# Patient Record
Sex: FEMALE | Race: WHITE | NOT HISPANIC OR LATINO | URBAN - METROPOLITAN AREA
[De-identification: names, ages, dates, MRNs, and addresses within clinical notes are randomized per-mention and may not be internally consistent; named-entity substitution may affect disease eponyms.]

---

## 2024-05-08 PROBLEM — L29.9 EAR ITCHING: Status: ACTIVE | Noted: 2024-05-08

## 2024-05-08 PROBLEM — H60.8X3 CHRONIC ECZEMATOUS OTITIS EXTERNA OF BOTH EARS: Status: ACTIVE | Noted: 2024-05-08

## 2024-05-08 RX ORDER — FLUOCINOLONE ACETONIDE 0.11 MG/ML
5 OIL AURICULAR (OTIC) 3 TIMES DAILY
COMMUNITY
Start: 2022-03-25

## 2024-05-08 RX ORDER — ASPIRIN 325 MG
1 TABLET ORAL DAILY
COMMUNITY
Start: 2022-03-25

## 2024-05-08 RX ORDER — CHOLECALCIFEROL (VITAMIN D3) 50 MCG
2000 TABLET ORAL DAILY
COMMUNITY
Start: 2022-03-25

## 2024-05-08 RX ORDER — BIOTIN 10 MG
TABLET ORAL
COMMUNITY
Start: 2022-03-25

## 2024-05-10 ENCOUNTER — TELEPHONE (OUTPATIENT)
Dept: OTOLARYNGOLOGY | Facility: CLINIC | Age: 54
End: 2024-05-10

## 2024-05-10 DIAGNOSIS — H60.8X3 CHRONIC ECZEMATOUS OTITIS EXTERNA OF BOTH EARS: Primary | ICD-10-CM

## 2024-05-11 RX ORDER — FLUOCINOLONE ACETONIDE 0.11 MG/ML
OIL AURICULAR (OTIC)
Qty: 20 ML | Refills: 0 | Status: SHIPPED | OUTPATIENT
Start: 2024-05-11

## 2024-05-11 NOTE — TELEPHONE ENCOUNTER
Follow-up in the office for recheck of the ears is advised since it has been 2 years since the last checkup.

## 2025-01-03 NOTE — PROGRESS NOTES
Subjective   Patient ID: Freda Olea is a 54 y.o. female who presents for No chief complaint on file..  HPI  54-year-old female last seen in the office in March 2022 is being seen today for problems related to her history of chronic eczematous otitis externa.  When seen at that previous assessment she was started on Lotrisone ointment and fluocinolone oil drops as treatment a number of years ago during a pregnancy with one of her children she had an ear infection along with facial paralysis that required a myringotomy tube to drain the infection.  Review of Systems   A 12 point ROS  has been reviewed and are negative for complaint except for what is stated in the history of present illness and /or for past medical history as noted in the EMR.    Past Medical History:   Diagnosis Date    Personal history of malignant neoplasm of cervix uteri     History of cervical cancer    Personal history of other diseases of the digestive system     History of gastroesophageal reflux (GERD)          Current Outpatient Medications:     cholecalciferol (Vitamin D-3) 50 MCG (2000 UT) tablet, Take 1 tablet (2,000 Units) by mouth once daily., Disp: , Rfl:     fluocinolone (DermOtic) 0.01 % ear drops, Administer 5 drops into each ear 3 times a day., Disp: , Rfl:     fluocinolone (DermOtic) 0.01 % ear drops, 2 to 3 drops in the ear canal for dryness and irritation 2 days/week, Disp: 20 mL, Rfl: 0    multivitamin (One Daily Multivitamin) tablet, Take 1 tablet by mouth once daily., Disp: , Rfl:     mv-min-C-glutamin-lysine-hb124 (Immune Support) 250-12.5 mg tablet,chewable, Chew., Disp: , Rfl:      Past Surgical History:   Procedure Laterality Date    OTHER SURGICAL HISTORY  03/25/2022    Radical hysterectomy    OTHER SURGICAL HISTORY  03/25/2022    Breast reduction    OTHER SURGICAL HISTORY  03/25/2022    Myringotomy with tube placement    OTHER SURGICAL HISTORY  03/25/2022    Eye surgery        Social History     Tobacco Use     Smoking status: Not on file    Smokeless tobacco: Not on file   Substance Use Topics    Alcohol use: Not on file        Allergies   Allergen Reactions    Ciprofloxacin Unknown    Doxycycline Unknown    Sulfamethoxazole Unknown       There were no vitals taken for this visit.    Objective   Physical Exam  EXAMINATION:    GENERAL JAKY.EARANCE: Alert, in no acute distress, normal pitch and clarity of voice, well-developed and nourished, cooperative.    HEAD/FACE: Normocephalic, atraumatic, normal facial movements and strength, no no tenderness to palpation, no lesions noted.    SKIN: Normal turgor, no raised or ulcerative lesions, warm and dry to palpation.    EYES: Extraocular motions intact, no nystagmus noted, pupils equal and reactive to light and accommodation, no conjunctivitis.    EARS: Both ears--external ear anatomy is normal without lesions, auditory canals are patent and without skin abrasions or lesions, hearing is intact to voice, epithelial and ceruminous debris was partially obstructed to the ear canal and surface of the tympanic membranes bilaterally.  See procedure note     NOSE: No external skin lesions are noted, nares are patent, septum is intact, sinuses are nontender to palpation bilaterally, no intranasal lesions or inflammation is noted, nasal valve is normal.    OROPHARYNX/ORAL CAVITY: Oropharynx is not inflamed and is without lesions, mucosa of the oral cavity is intact and without lesions, tongue is midline and mobile, no acute dental disease is noted, TMJs are mobile    LUNG-- NO wheezing or rhonchi normal respiratory effort    HEART-- No venous congestion,  rate and rhythm regular,    NECK: No lymphadenopathy is palpated, carotid pulses are intact, neck is supple with full range of motion, no thyroid abnormalities are noted, trachea is midline, no neck masses are palpated.    LYMPHATICS: No cervical adenopathy or supraclavicular adenopathy is palpated.    NEUROLOGIC/PSYCH; alert and  oriented, cranial nerves are grossly intact, gait is without falling, no motor deficits are noted.    Patient ID: Freda Olea is a 54 y.o. female.    Binocular microscopy    Date/Time: 1/6/2025 2:03 PM    Performed by: Stephen Beltran DMD, MD  Authorized by: Stephen Beltran DMD, MD    Consent:     Consent obtained:  Verbal    Consent given by:  Patient    Risks discussed:  Pain    Alternatives discussed:  No treatment and observation  Procedure details:     Indications: examination of tympanic membrane and debridement    Post-procedure details:     Patient tolerance of procedure:  Tolerated well, no immediate complications  Comments:      Microscopic evaluation of both ears reveal evidence for epithelial shedding that was obstructive to a portion of the tympanic membrane's and ear canal walls.  This was able to be removed mostly with suctioning.  The tympanic members were normal in appearance with no signs of middle ear disease.  No external dryness was noted.    Assessment/Plan   Problem List Items Addressed This Visit             ICD-10-CM    Chronic eczematous otitis externa of both ears - Primary H60.8X3    Ear itching L29.9        I discussed the clinical findings with the patient. Dry skin in the ear canals well account for a variety of symptoms including itching, irritation, pain if the skin is infected which can then also be associated with otorrhea. The skin in the ear canal is unlike skin anywhere else and can be treated by lubrication. External hand cream can be utilized on the external ear and opening of the ear canal along with the use of oil drops such as baby oil or sweet oil or prescription drops in the form of Derm-otic oil. Avoidance of water in the ear and avoidance of Q-tip use is recommended. If pain and otorrhea develops then infection is suggested and medicated antibiotic drops or acetic acid drops would be used as treatment.  I suggested she contact the office if there is any pain  drainage or change in hearing.  She is chosen not to have a scheduled follow-up but will contact the office if she needs assistance.    Stephen Beltran DMD, MD 01/03/25 10:39 AM

## 2025-01-06 ENCOUNTER — APPOINTMENT (OUTPATIENT)
Dept: OTOLARYNGOLOGY | Facility: CLINIC | Age: 55
End: 2025-01-06
Payer: COMMERCIAL

## 2025-01-06 VITALS — BODY MASS INDEX: 22.08 KG/M2 | WEIGHT: 120 LBS | HEIGHT: 62 IN

## 2025-01-06 DIAGNOSIS — L29.9 EAR ITCHING: ICD-10-CM

## 2025-01-06 DIAGNOSIS — H60.8X3 CHRONIC ECZEMATOUS OTITIS EXTERNA OF BOTH EARS: Primary | ICD-10-CM

## 2025-01-06 PROCEDURE — 92504 EAR MICROSCOPY EXAMINATION: CPT | Performed by: OTOLARYNGOLOGY

## 2025-01-06 PROCEDURE — 99213 OFFICE O/P EST LOW 20 MIN: CPT | Performed by: OTOLARYNGOLOGY

## 2025-01-06 PROCEDURE — 3008F BODY MASS INDEX DOCD: CPT | Performed by: OTOLARYNGOLOGY

## 2025-01-06 PROCEDURE — 1036F TOBACCO NON-USER: CPT | Performed by: OTOLARYNGOLOGY

## 2025-01-06 RX ORDER — FLUOCINOLONE ACETONIDE 0.11 MG/ML
OIL AURICULAR (OTIC)
Qty: 20 ML | Refills: 2 | Status: SHIPPED | OUTPATIENT
Start: 2025-01-06

## 2025-01-06 RX ORDER — PANTOPRAZOLE SODIUM 20 MG/1
20 TABLET, DELAYED RELEASE ORAL
COMMUNITY

## 2025-01-06 RX ORDER — ESTRADIOL 10 UG/1
10 INSERT VAGINAL 2 TIMES WEEKLY
COMMUNITY
Start: 2024-05-01

## 2025-08-21 ENCOUNTER — APPOINTMENT (OUTPATIENT)
Dept: OTOLARYNGOLOGY | Facility: CLINIC | Age: 55
End: 2025-08-21
Payer: COMMERCIAL